# Patient Record
(demographics unavailable — no encounter records)

---

## 2025-03-06 NOTE — PHYSICAL EXAM
[No Respiratory Distress] : no respiratory distress  [No Accessory Muscle Use] : no accessory muscle use [Clear to Auscultation] : lungs were clear to auscultation bilaterally [Normal Rate] : normal rate  [Regular Rhythm] : with a regular rhythm [No Focal Deficits] : no focal deficits [Normal] : affect was normal and insight and judgment were intact

## 2025-03-06 NOTE — ASSESSMENT
[FreeTextEntry1] : 51M w/ PMH of diverticulitis, cervical stenosis, obesity, HLD is coming in for weight management.  #OBesity -Given that weight loss has stagnated, increase Zepbound to 7.5mg weekly -CBC and CMP ordered  -Recheck in 3 months  #Cough -Likely viral URI, CTM  RTC in 3 months
